# Patient Record
Sex: FEMALE | Race: WHITE | HISPANIC OR LATINO | URBAN - METROPOLITAN AREA
[De-identification: names, ages, dates, MRNs, and addresses within clinical notes are randomized per-mention and may not be internally consistent; named-entity substitution may affect disease eponyms.]

---
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
## 2024-10-07 ENCOUNTER — NON-PROVIDER VISIT (OUTPATIENT)
Dept: URGENT CARE | Facility: CLINIC | Age: 19
End: 2024-10-07

## 2024-10-07 DIAGNOSIS — Z02.1 PRE-EMPLOYMENT DRUG SCREENING: ICD-10-CM

## 2024-10-07 LAB
AMP AMPHETAMINE: NORMAL
COC COCAINE: NORMAL
INT CON NEG: NEGATIVE
INT CON POS: POSITIVE
MET METHAMPHETAMINES: NORMAL
OPI OPIATES: NORMAL
PCP PHENCYCLIDINE: NORMAL
POC DRUG COMMENT 753798-OCCUPATIONAL HEALTH: NORMAL
THC: NORMAL

## 2024-10-07 PROCEDURE — 80305 DRUG TEST PRSMV DIR OPT OBS: CPT

## 2024-12-08 ENCOUNTER — OFFICE VISIT (OUTPATIENT)
Dept: URGENT CARE | Facility: CLINIC | Age: 19
End: 2024-12-08
Payer: COMMERCIAL

## 2024-12-08 VITALS
RESPIRATION RATE: 14 BRPM | TEMPERATURE: 97.7 F | WEIGHT: 156 LBS | HEIGHT: 60 IN | OXYGEN SATURATION: 98 % | BODY MASS INDEX: 30.63 KG/M2 | DIASTOLIC BLOOD PRESSURE: 74 MMHG | SYSTOLIC BLOOD PRESSURE: 110 MMHG | HEART RATE: 89 BPM

## 2024-12-08 DIAGNOSIS — R05.1 ACUTE COUGH: ICD-10-CM

## 2024-12-08 PROCEDURE — 3078F DIAST BP <80 MM HG: CPT | Performed by: PHYSICIAN ASSISTANT

## 2024-12-08 PROCEDURE — 3074F SYST BP LT 130 MM HG: CPT | Performed by: PHYSICIAN ASSISTANT

## 2024-12-08 PROCEDURE — 99203 OFFICE O/P NEW LOW 30 MIN: CPT | Performed by: PHYSICIAN ASSISTANT

## 2024-12-08 RX ORDER — METHYLPREDNISOLONE 4 MG/1
4 TABLET ORAL DAILY
Qty: 21 TABLET | Refills: 0 | Status: SHIPPED | OUTPATIENT
Start: 2024-12-08 | End: 2024-12-11 | Stop reason: SDUPTHER

## 2024-12-08 RX ORDER — AZITHROMYCIN 250 MG/1
TABLET, FILM COATED ORAL
Qty: 6 TABLET | Refills: 0 | Status: SHIPPED | OUTPATIENT
Start: 2024-12-08 | End: 2024-12-11 | Stop reason: SDUPTHER

## 2024-12-08 ASSESSMENT — ENCOUNTER SYMPTOMS
EYE REDNESS: 0
DIAPHORESIS: 0
DIARRHEA: 0
NAUSEA: 0
DIZZINESS: 0
HEADACHES: 0
SHORTNESS OF BREATH: 1
SORE THROAT: 0
CHILLS: 0
ABDOMINAL PAIN: 0
CONSTIPATION: 0
VOMITING: 0
EYE PAIN: 0
COUGH: 1
SINUS PAIN: 0
WHEEZING: 0
FEVER: 0
EYE DISCHARGE: 0

## 2024-12-09 NOTE — PROGRESS NOTES
Subjective:     Savannah Rylee Duenas  is a 19 y.o. female who presents for Cough (Patient is here today for a cough that is now making her chest hurt. Patient states SOB when she is coughing )       Presents today with cough that has been ongoing the last week.  Has mild shortness of breath during coughing episodes.  No fevers, no severe chest pain.  No nausea or vomiting, no dumping, no diarrhea.  Denies any recent known close contacts.       Review of Systems   Constitutional:  Negative for chills, diaphoresis, fever and malaise/fatigue.   HENT:  Negative for congestion, ear discharge, sinus pain and sore throat.    Eyes:  Negative for pain, discharge and redness.   Respiratory:  Positive for cough and shortness of breath. Negative for wheezing.    Cardiovascular:  Negative for chest pain.   Gastrointestinal:  Negative for abdominal pain, constipation, diarrhea, nausea and vomiting.   Neurological:  Negative for dizziness and headaches.      No Known Allergies  History reviewed. No pertinent past medical history.     Objective:   /74   Pulse 89   Temp 36.5 °C (97.7 °F) (Temporal)   Resp 14   Ht 1.524 m (5')   Wt 70.8 kg (156 lb)   SpO2 98%   BMI 30.47 kg/m²   Physical Exam  Vitals and nursing note reviewed.   Constitutional:       General: She is not in acute distress.     Appearance: Normal appearance. She is not ill-appearing, toxic-appearing or diaphoretic.   HENT:      Head: Normocephalic.      Right Ear: Tympanic membrane, ear canal and external ear normal. There is no impacted cerumen.      Left Ear: Tympanic membrane, ear canal and external ear normal. There is no impacted cerumen.      Nose: No congestion or rhinorrhea.      Mouth/Throat:      Mouth: Mucous membranes are moist.      Pharynx: No oropharyngeal exudate or posterior oropharyngeal erythema.   Eyes:      General:         Right eye: No discharge.         Left eye: No discharge.      Conjunctiva/sclera: Conjunctivae normal.    Cardiovascular:      Rate and Rhythm: Normal rate and regular rhythm.   Pulmonary:      Effort: Pulmonary effort is normal. No respiratory distress.      Breath sounds: Normal breath sounds. No stridor. No wheezing or rhonchi.   Musculoskeletal:      Cervical back: Neck supple.   Lymphadenopathy:      Cervical: No cervical adenopathy.   Neurological:      General: No focal deficit present.      Mental Status: She is alert and oriented to person, place, and time.   Psychiatric:         Mood and Affect: Mood normal.         Behavior: Behavior normal.         Thought Content: Thought content normal.         Judgment: Judgment normal.             Diagnostic testing: None    Assessment/Plan:     Encounter Diagnoses   Name Primary?    Acute cough           Plan for care for today's complaint includes starting patient on Z-Alfred and Tessalon Perles.  Lung auscultation was normal today, no rales, wheezes, rhonchi.  Vital signs were stable during today's office visit, patient was overall well-appearing. Continue to monitor symptoms and return to urgent care or follow-up with primary care provider if symptoms remain ongoing.  Follow-up in the emergency department if symptoms become severe, ER precautions discussed in office today..  Prescription for Z-Alfred, Tessalon Perles provided.    See AVS Instructions below for written guidance provided to patient on after-visit management and care in addition to our verbal discussion during the visit.    Please note that this dictation was created using voice recognition software. I have attempted to correct all errors, but there may be sound-alike, spelling, grammar and possibly content errors that I did not discover before finalizing the note.    Pablo Gallardo PA-C

## 2024-12-10 ENCOUNTER — TELEPHONE (OUTPATIENT)
Dept: URGENT CARE | Facility: CLINIC | Age: 19
End: 2024-12-10

## 2024-12-11 DIAGNOSIS — R05.1 ACUTE COUGH: ICD-10-CM

## 2024-12-11 RX ORDER — AZITHROMYCIN 250 MG/1
TABLET, FILM COATED ORAL
Qty: 6 TABLET | Refills: 0 | Status: SHIPPED | OUTPATIENT
Start: 2024-12-11

## 2024-12-11 RX ORDER — METHYLPREDNISOLONE 4 MG/1
4 TABLET ORAL DAILY
Qty: 21 TABLET | Refills: 0 | Status: SHIPPED | OUTPATIENT
Start: 2024-12-11

## 2024-12-11 NOTE — TELEPHONE ENCOUNTER
Per my records I had a confirmation that pharmacy had received my order on date of visit. I will resend though, please notify the patient, thanks